# Patient Record
Sex: FEMALE | Race: WHITE | URBAN - NONMETROPOLITAN AREA
[De-identification: names, ages, dates, MRNs, and addresses within clinical notes are randomized per-mention and may not be internally consistent; named-entity substitution may affect disease eponyms.]

---

## 2018-04-19 ENCOUNTER — HISTORICAL (OUTPATIENT)
Dept: ADMINISTRATIVE | Facility: HOSPITAL | Age: 53
End: 2018-04-19

## 2018-04-25 ENCOUNTER — HISTORICAL (OUTPATIENT)
Dept: ADMINISTRATIVE | Facility: HOSPITAL | Age: 53
End: 2018-04-25

## 2021-04-26 ENCOUNTER — HISTORICAL (OUTPATIENT)
Dept: ADMINISTRATIVE | Facility: HOSPITAL | Age: 56
End: 2021-04-26

## 2021-05-10 LAB — FINAL CULTURE: NORMAL

## 2021-06-17 ENCOUNTER — HISTORICAL (OUTPATIENT)
Dept: INFUSION THERAPY | Facility: HOSPITAL | Age: 56
End: 2021-06-17

## 2022-05-02 NOTE — HISTORICAL [facility] CERNER
This is a historical note converted from Shavon. Formatting and pictures may have been removed.  Please reference Shavon for original formatting and attached multimedia. History of Present Illness  This is a 56-year-old white female patient of Dr. Alexis Purvis?from [facility]?who presented with constipation. ?Work-up consisted of CT scans and colonoscopy which indicated a right cecal mass. ?Upon 4/21/2021 ?Hope Pederson?performed a right hemicolectomy with ileocolonic anastomosis?which came back as adenocarcinoma the colon.? Patient had further work-ups and had multiple nodules of the lungs?and showed cirrhosis and possible mets to distant sites from the colon.? She has underlying diabetes,?ascitic?appearing abdomen presume for?cirrhosis and possible?liver disease (although she declines being a alcoholic).? She is being admitted here for?postoperative inflammation/complications with scar tissue from her?wound?and an open abdomen?postoperatively?as well as?nutrition?and monitoring.  Review of Systems  Fatigue,?lethargy, nausea, early satiety,?ascites,?weakness,?muscular?weakness and decompensation. ?Muscular atrophy  Physical Exam  Vitals & Measurements  T:?36.6? ?C (Oral)? TMIN:?36.5? ?C (Oral)? TMAX:?36.6? ?C (Oral)? HR:?134(Monitored)? RR:?18? BP:?114/83? SpO2:?94%? WT:?58?kg? BMI:?25.1?  Physical exam she is alert and oriented x2.? She appears chronically weak?and frail. ?Appears older than her stated age.? Patient appears?to be conversant.? No JVD. ?Cranials 2 through 12 are intact. ?She has a hirsute face poor dentition?regular rate and rhythm no rubs gallops or murmurs. ?Borderline tachycardia 100.? Lungs are clear to auscultation bilaterally soft?distended abdomen with a midline?wound dressing?presumably open?is healing by secondary intention.? She is nontender but full to the abdomen.? She has a diaper on.? She is incontinent.? Bilateral lower extremities show atrophy no heel  breakdown.?  Assessment/Plan  1.?Anemia?D64.9  ?Will work-up. ?We will get stools. ?Concerning for?acute blood loss or chronic blood loss versus?anemia from?a chronic?sickly condition?over the last 2 to 3 weeks.  2.?Cirrhosis?K74.60  ?Unclear basis for cirrhosis. ?She denies a drinking?alcohol.? Lengthy process.? Autoimmune hepatitis?? Versus a metastatic predominance in the liver with?decreased hepatic function.  3.?Diabetes?E11.9  ?We will continue to monitor with sliding scale insulin.  4.?HTN (hypertension)?I10  ?We will continue to monitor her blood pressures and give blood pressure meds as needed.  5.?Colon cancer metastasized to multiple sites?C18.9  ?Goal of therapy is to get her through the abdominal wound healing phase, nourish her intravenously with TPN at this point and ultimately consultation with hematology oncology.  6.?Multiple pulmonary nodules determined by computed tomography of lung?R91.8  ?Again same as #5.  7.?Ascites?R18.8  ?We will monitor her ascites. ?Consider?diuretics for this once we establish her baseline?her response to those diuretics.  8.?Hyperammonemia?E72.20  ?Lactulose orally daily.  Orders:  acetaminophen, 650 mg, form: Tab, Oral, q4hr PRN for pain, first dose 04/23/21 16:23:00 CDT  acetaminophen, 650 mg, form: Tab, Oral, q4hr PRN for fever, first dose 04/23/21 16:23:00 CDT, Notify MD for temp > 101 F?  bisacodyl, 10 mg, form: Supp, CA (rectal), q24hr PRN for constipation, first dose 04/23/21 16:23:00 CDT  Dextrose 50% in Water intravenous solution, 25 mL, 12.5 gm =, form: Injection, IV Push, Once PRN for blood glucose, Infuse over: 5 minute(s), first dose 04/23/21 17:01:00 CDT, Unconscious patient: Look for other source of altered mental status.  Dextrose 50% in Water intravenous solution, 25 mL, 12.5 gm =, form: Injection, IV Push, As Directed PRN for blood glucose, Infuse over: 5 minute(s), first dose 04/23/21 17:01:00 CDT, Unconscious patient: Repeat as ordered per  protocol.  Dextrose 50% in Water intravenous solution, 50 mL, 25 gm =, form: Injection, IV Push, As Directed PRN for blood glucose, Infuse over: 5 minute(s), first dose 04/23/21 17:01:00 CDT, Unconscious patient: Repeat as ordered per protocol.  glucagon, 1 mg, form: Injection, IM, q10min PRN for blood glucose, first dose 04/23/21 17:01:00 CDT, Unconscious patient: Patient with NO IV access available and BG < 70 mg/dl.  glucagon, 1 mg, form: Injection, IM, q10min PRN for blood glucose, first dose 04/23/21 17:01:00 CDT, Conscious patient.  insulin lispro, 4-28 units, form: Injection, Subcutaneous, As Directed PRN for blood glucose, first dose 04/23/21 17:01:00 CDT  sodium chloride, 10 mL, form: Injection, IV Push, q12hr, first dose 04/23/21 17:00:00 CDT  sodium chloride, 10 mL, form: Injection, IV Push, BID PRN for not specified, first dose 04/23/21 16:23:00 CDT  trace elements 1 mL + multivitamin 10 mL + insulin regular 25 units + Central AA 5% with 20% Dextro, 1,000 mL, 1,000 mL, IV, 60 mL/hr, start date 04/23/21 17:21:00 CDT  Admit to LTAC/SNF, 04/23/21 16:23:00 CDT, Long Term Acute Care Inpatient  Ammonia Level, Routine collect, 04/24/21 5:00:00 CDT, Blood, Stop date 04/24/21 5:00:00 CDT, Lab Collect, 04/24/21 5:00:00 CDT  Blood Glucose Monitoring POC, 04/23/21 21:00:00 CDT, q6hr, 04/23/21 21:00:00 CDT  Cardiac Monitoring, 04/23/21 16:23:00 CDT, Constant Order  CBC w/ Auto Diff, Routine collect, 04/24/21 5:00:00 CDT, Blood, Once, Stop date 04/24/21 5:00:00 CDT, Lab Collect, in AM, 04/24/21 5:00:00 CDT  Clear Liquid Diet, 04/23/21 16:23:00 CDT, Start Meal: Next Meal  Comprehensive Metabolic Panel, Routine collect, 04/24/21 5:00:00 CDT, Blood, Once, Stop date 04/24/21 5:00:00 CDT, Lab Collect, in AM, 04/24/21 5:00:00 CDT  Consult Case Management, 04/23/21 16:23:00 CDT, initiate discharge planning  Consult to Nutritionist Adult, 04/23/21 16:23:00 CDT, nutritional assessment  Consult Wound Care Nurse, 04/23/21  16:23:00 CDT, skin assessment  Intake and Output, 04/23/21 16:23:00 CDT, q12hr  Magnesium Level, Routine collect, 04/24/21 5:00:00 CDT, Blood, Once, Stop date 04/24/21 5:00:00 CDT, Lab Collect, in AM, 04/24/21 5:00:00 CDT  MD to Nurse, 04/23/21 17:01:00 CDT, Unconscious patient: Blood glucose 70 - 100 mg/dl, give 25 ml of D50W IVP but look for other source of altered mental status.  MD to Nurse, 04/23/21 17:01:00 CDT, Unconscious patient: Blood glucose 50 - 69, give 25 ml of D50W IVP and repeat as ordered until patient wakes up or BG > 70mg/dl.  MD to Nurse, 04/23/21 17:01:00 CDT, Unconscious patient: Blood glucose <50 mg/dl, give 50 ml of D50W IVP and repeat as ordered until patient wakes up or BG > 70 mg/dl.  MD to Nurse, 04/23/21 17:01:00 CDT, Unconscious patient: Repeat BG every 15 - 20 minutes following episode until BG > 70 per protocol.  MD to Nurse, 04/23/21 17:01:00 CDT, Unconscious patient: Blood glucose <70, give 1 mg glucagon IM, and place IV access; move to protocol above  MD to Nurse, 04/23/21 17:01:00 CDT, Conscious patient: Blood glucose  mg/dl, having symptoms, give light snack such as simon crackers and milk. Rationale: Symptomatic patient needs protein and carbs for stability.  MD to Nurse, 04/23/21 17:01:00 CDT, Conscious patient: Blood glucose 45-69 mg/dl; give 15 gm of fast acting carbs (4 oz. juice, regular soft drink, glucose tabs). Rationale: Low blood sugar; patient needs fast acting sugar.  MD to Nurse, 04/23/21 17:01:00 CDT, Conscious patient: Blood glucose <45 mg/dl and IV access, give 25 ml D50W IVP. If no IV access available, 1 mg glucagon IM and 15 grams of fast-acting carbohydrate. Notify treating MD for further orders.  MD to Nurse, 04/23/21 17:01:00 CDT, Conscious patient: For blood glucose <70 repeat BG every 15-20 minutes and treat again if hypoglycemia persists  MD to Nurse, 04/23/21 17:01:00 CDT, Do not arbitrarily hold insulin without calling physician  OT Evaluation  Only, 04/23/21 16:23:00 CDT, ADL, Stop date 04/23/21 16:23:00 CDT  Patient Isolation, 04/23/21 16:23:00 CDT, Standard Precautions, CM Isolation, Constant Indicator  Phosphorus Level, Routine collect, 04/24/21 5:00:00 CDT, Blood, Once, Stop date 04/24/21 5:00:00 CDT, Lab Collect, Lab Collected, 04/24/21 5:00:00 CDT  Prealbumin, Routine collect, 04/24/21 5:00:00 CDT, Blood, Stop date 04/24/21 5:00:00 CDT, Lab Collect, 04/24/21 5:00:00 CDT  PT Evaluation Only, 04/23/21 16:23:00 CDT, Mobility  Resuscitation Status, 04/23/21 17:41:00 CDT, Full Resuscitation  Sedimentation Rate, Routine collect, 04/24/21 5:00:00 CDT, Blood, Once, Stop date 04/24/21 5:00:00 CDT, Lab Collect, in AM, 04/24/21 5:00:00 CDT  Thyroid Stimulating Hormone, Routine collect, 04/24/21 5:00:00 CDT, Blood, Stop date 04/24/21 5:00:00 CDT, Lab Collect, in AM, 04/24/21 5:00:00 CDT  Up to Chair, 04/23/21 16:23:00 CDT, Constant Order  Vital Signs, 04/23/21 16:23:00 CDT, q4hr, If telemetry monitoring in place  Weight, 04/23/21 16:23:00 CDT, qweek at 0500, Upon admission  Wound Care, 04/23/21 17:05:00 CDT, Constant order, apply maxsorb to abdomen wound cover with non adherant dressing daily  Wound Care, 04/23/21 17:12:00 CDT, Constant order, daily apply foam dressing to right buttock  XR Abdomen KUB 1 View, Routine, 04/24/21 5:00:00 CDT, Constipation, None, Stretcher, Rad Type, Not Scheduled, 04/24/21 5:00:00 CDT  XR Chest 1 View, Routine, 04/24/21 5:00:00 CDT, Congestion, None, Portable, Rad Type, Not Scheduled, 04/24/21 5:00:00 CDT  Time spent 1 hour 15 minutes   Problem List/Past Medical History  Ongoing  Anemia  Ascites  Cirrhosis  Colon cancer metastasized to multiple sites  Depression  Diabetes  Diabetic neuropathy  Heart attack  HTN (hypertension)  Hyperammonemia  Multiple pulmonary nodules determined by computed tomography of lung  Thrombocytopenia  Historical  No qualifying data  Procedure/Surgical History  Intramedullary Nail Insertion Hip (Right)  (2019)  Reposition Right Upper Femur with Intramedullary Internal Fixation Device, Percutaneous Approach (2019)  EGD (2017)  Cholecystectomy ()  Appendectomy ()   x3  HEART STENTS X7  Hysterectomy   Medications  Inpatient  acetaminophen 325 mg oral tablet, 650 mg= 2 tab(s), Oral, q4hr, PRN  acetaminophen 325 mg oral tablet, 650 mg= 2 tab(s), Oral, q4hr, PRN  Dextrose 50% and Water (50 mL vial/syringe), 12.5 gm= 25 mL, IV Push, Once, PRN  Dextrose 50% and Water (50 mL vial/syringe), 12.5 gm= 25 mL, IV Push, As Directed, PRN  Dextrose 50% and Water (50 mL vial/syringe), 25 gm= 50 mL, IV Push, As Directed, PRN  Dulcolax Laxative 10 mg RECTAL suppository, 10 mg= 1 supp, NC (rectal), q24hr, PRN  glucagon, 1 mg= 1 EA, IM, q10min, PRN  glucagon, 1 mg= 1 EA, IM, q10min, PRN  insulin lispro, 4-28 units, Subcutaneous, As Directed, PRN  Sodium Chloride 0.9% Normal Saline Flush, 10 mL, IV Push, q12hr  Sodium Chloride 0.9% Normal Saline Flush, 10 mL, IV Push, BID, PRN  trace elements 1 mL + multivitamin 10 mL + insulin regular 25 units + Central AA 5% with 20% Dextro  Home  atorvastatin 20 mg oral tablet, 20 mg= 1 tab(s), Oral, Daily  carvedilol 6.25 mg oral tablet  cephalexin 500 mg oral capsule, 500 mg= 1 cap(s), Oral, q12hr  DULoxetine 60 mg oral delayed release capsule, 60 mg= 1 cap(s), Oral, Daily  fat emulsion 20% intravenous additive, IV, Every other day  Lantus Solostar Pen 100 units/mL subcutaneous solution, 40 units= 40 units, Subcutaneous, qPM  latanoprost 0.005% ophthalmic solution, 1 drop(s), OPTH, qPM  Lopressor 1 mg/mL injectable solution, 5 mg, IV, q6hr, PRN  metoprolol succinate 25 mg oral capsule, extended release, 25 mg= 1 cap(s), Oral, Daily  Multi Vitamin+  ondansetron, 8 mg, Oral, q8hr, PRN  Zosyn 3.375 gm (for IVPB), IV, q6hr  ZyPREXA 10 mg oral tablet, 10 mg= 1 tab(s), Oral, Once a day (at bedtime)  Allergies  codeine?(itching)  morphine?(itching)  Social  History  Abuse/Neglect  No, 2021  No, 03/10/2021  No, 2019  Alcohol  Current, Wine, 1-2 times per year, Started age 45 Years., 2017  Substance Use  Never, 2017  Tobacco  Never (less than 100 in lifetime), N/A, 2021  Never (less than 100 in lifetime), N/A, 03/10/2021  Never (less than 100 in lifetime), N/A, 2019  Family History  Her mother  of leukemia her father is healthy. ?She has 1 son and lives with her ex-.  Lab Results  Her last set of labs on 2021 showed a?glucose of 58 BUN and creatinine are 38?and 0.5.? Serum osmolality 288.? LFTs within normal limits. ?Calcium 7.4?sodium is 139 potassium 2.5 chloride 110?CO2 25.2.? Ammonia was 52.8  Her magnesium was?1.3?White blood cell count on the  was 18.2 H&H was 9.9 and 32.4?platelet count was 218.? Her?H&H on?422?was 7 and 23.1.  ?   Urine on 422 was negative.  ?  ?  ?  Diagnostic Results  CT from 4/10/2021?shows?2 noncalcified nodules now within the right lung base?approximately 6?x 8 mm.? These were not present in May 2018. ?She is a small pericardial effusion and small left pleural effusion.? The liver remains abnormal edema and markedly?diffusely?cirrhotic appearing with portal venous hypertension?with prominent periumbilical vessels?and splenomegaly?initially the cecum showed a 6 x 4 cm mass?atherosclerotic aortic?calcifications without aneurysm.  ?  Repeat CT on 2021?showed a pneumoperitoneum status post right hemicolectomy and anastomotic bowel wall thickening?and small dilation?he also showed a 9 x 8 mm noncalcified pulmonary nodule in the right lower lobe and a 7 x 6 on the left lower lobe?and a 5 x 6 on the?left lower lobe as well.? Hepatic cirrhosis as well on the CT  ?  ?  ?  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  ?  Cytology was negative